# Patient Record
Sex: FEMALE | Race: WHITE | NOT HISPANIC OR LATINO
[De-identification: names, ages, dates, MRNs, and addresses within clinical notes are randomized per-mention and may not be internally consistent; named-entity substitution may affect disease eponyms.]

---

## 2023-11-27 ENCOUNTER — APPOINTMENT (OUTPATIENT)
Dept: OBGYN | Facility: CLINIC | Age: 28
End: 2023-11-27
Payer: COMMERCIAL

## 2023-11-27 PROCEDURE — 36415 COLL VENOUS BLD VENIPUNCTURE: CPT

## 2023-11-27 PROCEDURE — 99204 OFFICE O/P NEW MOD 45 MIN: CPT | Mod: 25

## 2023-11-27 PROCEDURE — 76817 TRANSVAGINAL US OBSTETRIC: CPT

## 2023-12-11 ENCOUNTER — APPOINTMENT (OUTPATIENT)
Dept: OBGYN | Facility: CLINIC | Age: 28
End: 2023-12-11
Payer: COMMERCIAL

## 2023-12-11 PROCEDURE — 0502F SUBSEQUENT PRENATAL CARE: CPT

## 2023-12-11 PROCEDURE — 76817 TRANSVAGINAL US OBSTETRIC: CPT

## 2023-12-11 PROCEDURE — 36415 COLL VENOUS BLD VENIPUNCTURE: CPT

## 2024-01-05 ENCOUNTER — APPOINTMENT (OUTPATIENT)
Dept: OBGYN | Facility: CLINIC | Age: 29
End: 2024-01-05
Payer: COMMERCIAL

## 2024-01-05 PROCEDURE — 0502F SUBSEQUENT PRENATAL CARE: CPT

## 2024-01-05 PROCEDURE — 76813 OB US NUCHAL MEAS 1 GEST: CPT

## 2024-01-05 PROCEDURE — 36415 COLL VENOUS BLD VENIPUNCTURE: CPT

## 2024-01-26 ENCOUNTER — APPOINTMENT (OUTPATIENT)
Dept: OBGYN | Facility: CLINIC | Age: 29
End: 2024-01-26
Payer: COMMERCIAL

## 2024-01-26 PROCEDURE — 0502F SUBSEQUENT PRENATAL CARE: CPT

## 2024-01-26 PROCEDURE — 36415 COLL VENOUS BLD VENIPUNCTURE: CPT

## 2024-03-06 ENCOUNTER — APPOINTMENT (OUTPATIENT)
Dept: OBGYN | Facility: CLINIC | Age: 29
End: 2024-03-06
Payer: COMMERCIAL

## 2024-03-06 ENCOUNTER — ASOB RESULT (OUTPATIENT)
Age: 29
End: 2024-03-06

## 2024-03-06 ENCOUNTER — APPOINTMENT (OUTPATIENT)
Dept: ANTEPARTUM | Facility: CLINIC | Age: 29
End: 2024-03-06
Payer: COMMERCIAL

## 2024-03-06 PROBLEM — Z00.00 ENCOUNTER FOR PREVENTIVE HEALTH EXAMINATION: Status: ACTIVE | Noted: 2024-03-06

## 2024-03-06 PROCEDURE — 76811 OB US DETAILED SNGL FETUS: CPT

## 2024-03-06 PROCEDURE — 0502F SUBSEQUENT PRENATAL CARE: CPT

## 2024-03-27 ENCOUNTER — APPOINTMENT (OUTPATIENT)
Dept: OBGYN | Facility: CLINIC | Age: 29
End: 2024-03-27
Payer: COMMERCIAL

## 2024-03-27 PROCEDURE — 0502F SUBSEQUENT PRENATAL CARE: CPT

## 2024-03-27 PROCEDURE — 96127 BRIEF EMOTIONAL/BEHAV ASSMT: CPT

## 2024-04-25 ENCOUNTER — APPOINTMENT (OUTPATIENT)
Dept: OBGYN | Facility: CLINIC | Age: 29
End: 2024-04-25
Payer: COMMERCIAL

## 2024-04-25 PROCEDURE — 0502F SUBSEQUENT PRENATAL CARE: CPT

## 2024-04-25 PROCEDURE — 76819 FETAL BIOPHYS PROFIL W/O NST: CPT | Mod: 59

## 2024-04-25 PROCEDURE — 76816 OB US FOLLOW-UP PER FETUS: CPT

## 2024-04-25 PROCEDURE — 36415 COLL VENOUS BLD VENIPUNCTURE: CPT

## 2024-05-20 ENCOUNTER — APPOINTMENT (OUTPATIENT)
Dept: OBGYN | Facility: CLINIC | Age: 29
End: 2024-05-20
Payer: COMMERCIAL

## 2024-05-20 PROCEDURE — 90715 TDAP VACCINE 7 YRS/> IM: CPT

## 2024-05-20 PROCEDURE — 76816 OB US FOLLOW-UP PER FETUS: CPT

## 2024-05-20 PROCEDURE — 90471 IMMUNIZATION ADMIN: CPT

## 2024-05-20 PROCEDURE — 0502F SUBSEQUENT PRENATAL CARE: CPT

## 2024-06-06 ENCOUNTER — APPOINTMENT (OUTPATIENT)
Dept: OBGYN | Facility: CLINIC | Age: 29
End: 2024-06-06
Payer: COMMERCIAL

## 2024-06-06 PROCEDURE — 0502F SUBSEQUENT PRENATAL CARE: CPT

## 2024-06-19 ENCOUNTER — APPOINTMENT (OUTPATIENT)
Dept: OBGYN | Facility: CLINIC | Age: 29
End: 2024-06-19
Payer: COMMERCIAL

## 2024-06-19 PROCEDURE — 0502F SUBSEQUENT PRENATAL CARE: CPT

## 2024-06-26 ENCOUNTER — APPOINTMENT (OUTPATIENT)
Dept: OBGYN | Facility: CLINIC | Age: 29
End: 2024-06-26
Payer: COMMERCIAL

## 2024-06-26 PROCEDURE — 76819 FETAL BIOPHYS PROFIL W/O NST: CPT | Mod: 59

## 2024-06-26 PROCEDURE — 76816 OB US FOLLOW-UP PER FETUS: CPT

## 2024-06-26 PROCEDURE — 0502F SUBSEQUENT PRENATAL CARE: CPT

## 2024-07-02 ENCOUNTER — APPOINTMENT (OUTPATIENT)
Dept: OBGYN | Facility: CLINIC | Age: 29
End: 2024-07-02
Payer: COMMERCIAL

## 2024-07-02 PROCEDURE — 0502F SUBSEQUENT PRENATAL CARE: CPT

## 2024-07-10 ENCOUNTER — APPOINTMENT (OUTPATIENT)
Dept: OBGYN | Facility: CLINIC | Age: 29
End: 2024-07-10
Payer: COMMERCIAL

## 2024-07-10 PROCEDURE — 76818 FETAL BIOPHYS PROFILE W/NST: CPT | Mod: 59

## 2024-07-10 PROCEDURE — 76816 OB US FOLLOW-UP PER FETUS: CPT

## 2024-07-10 PROCEDURE — 0502F SUBSEQUENT PRENATAL CARE: CPT

## 2024-07-14 ENCOUNTER — INPATIENT (INPATIENT)
Facility: HOSPITAL | Age: 29
LOS: 2 days | Discharge: HOME | DRG: 951 | End: 2024-07-17
Attending: STUDENT IN AN ORGANIZED HEALTH CARE EDUCATION/TRAINING PROGRAM | Admitting: STUDENT IN AN ORGANIZED HEALTH CARE EDUCATION/TRAINING PROGRAM
Payer: COMMERCIAL

## 2024-07-14 ENCOUNTER — TRANSCRIPTION ENCOUNTER (OUTPATIENT)
Age: 29
End: 2024-07-14

## 2024-07-14 VITALS — OXYGEN SATURATION: 98 % | HEART RATE: 79 BPM

## 2024-07-14 DIAGNOSIS — O26.899 OTHER SPECIFIED PREGNANCY RELATED CONDITIONS, UNSPECIFIED TRIMESTER: ICD-10-CM

## 2024-07-15 ENCOUNTER — APPOINTMENT (OUTPATIENT)
Dept: OBGYN | Facility: CLINIC | Age: 29
End: 2024-07-15

## 2024-07-15 DIAGNOSIS — Z3A.40 40 WEEKS GESTATION OF PREGNANCY: ICD-10-CM

## 2024-07-15 DIAGNOSIS — Z34.80 ENCOUNTER FOR SUPERVISION OF OTHER NORMAL PREGNANCY, UNSPECIFIED TRIMESTER: ICD-10-CM

## 2024-07-15 LAB
BASOPHILS # BLD AUTO: 0.02 K/UL — SIGNIFICANT CHANGE UP (ref 0–0.2)
BASOPHILS NFR BLD AUTO: 0.2 % — SIGNIFICANT CHANGE UP (ref 0–2)
BLD GP AB SCN SERPL QL: NEGATIVE — SIGNIFICANT CHANGE UP
EOSINOPHIL # BLD AUTO: 0.07 K/UL — SIGNIFICANT CHANGE UP (ref 0–0.5)
EOSINOPHIL NFR BLD AUTO: 0.6 % — SIGNIFICANT CHANGE UP (ref 0–6)
HCT VFR BLD CALC: 35.2 % — SIGNIFICANT CHANGE UP (ref 34.5–45)
HGB BLD-MCNC: 11.8 G/DL — SIGNIFICANT CHANGE UP (ref 11.5–15.5)
IMM GRANULOCYTES NFR BLD AUTO: 0.9 % — SIGNIFICANT CHANGE UP (ref 0–0.9)
LYMPHOCYTES # BLD AUTO: 1.38 K/UL — SIGNIFICANT CHANGE UP (ref 1–3.3)
LYMPHOCYTES # BLD AUTO: 12.2 % — LOW (ref 13–44)
MCHC RBC-ENTMCNC: 29.1 PG — SIGNIFICANT CHANGE UP (ref 27–34)
MCHC RBC-ENTMCNC: 33.5 GM/DL — SIGNIFICANT CHANGE UP (ref 32–36)
MCV RBC AUTO: 86.9 FL — SIGNIFICANT CHANGE UP (ref 80–100)
MONOCYTES # BLD AUTO: 0.83 K/UL — SIGNIFICANT CHANGE UP (ref 0–0.9)
MONOCYTES NFR BLD AUTO: 7.3 % — SIGNIFICANT CHANGE UP (ref 2–14)
NEUTROPHILS # BLD AUTO: 8.92 K/UL — HIGH (ref 1.8–7.4)
NEUTROPHILS NFR BLD AUTO: 78.8 % — HIGH (ref 43–77)
NRBC # BLD: 0 /100 WBCS — SIGNIFICANT CHANGE UP (ref 0–0)
PLATELET # BLD AUTO: 241 K/UL — SIGNIFICANT CHANGE UP (ref 150–400)
RBC # BLD: 4.05 M/UL — SIGNIFICANT CHANGE UP (ref 3.8–5.2)
RBC # FLD: 12.9 % — SIGNIFICANT CHANGE UP (ref 10.3–14.5)
RH IG SCN BLD-IMP: POSITIVE — SIGNIFICANT CHANGE UP
RH IG SCN BLD-IMP: POSITIVE — SIGNIFICANT CHANGE UP
WBC # BLD: 11.32 K/UL — HIGH (ref 3.8–10.5)
WBC # FLD AUTO: 11.32 K/UL — HIGH (ref 3.8–10.5)

## 2024-07-15 PROCEDURE — 88305 TISSUE EXAM BY PATHOLOGIST: CPT | Mod: 26

## 2024-07-15 PROCEDURE — 59510 CESAREAN DELIVERY: CPT

## 2024-07-15 DEVICE — INTERCEED 3 X 4": Type: IMPLANTABLE DEVICE | Status: FUNCTIONAL

## 2024-07-15 RX ORDER — LANOLIN
1 WAX (GRAM) MISCELLANEOUS EVERY 6 HOURS
Refills: 0 | Status: DISCONTINUED | OUTPATIENT
Start: 2024-07-15 | End: 2024-07-17

## 2024-07-15 RX ORDER — DEXTROSE MONOHYDRATE AND SODIUM CHLORIDE 5; .3 G/100ML; G/100ML
1000 INJECTION, SOLUTION INTRAVENOUS ONCE
Refills: 0 | Status: COMPLETED | OUTPATIENT
Start: 2024-07-15 | End: 2024-07-15

## 2024-07-15 RX ORDER — DIPHENHYDRAMINE HCL 12.5MG/5ML
25 ELIXIR ORAL EVERY 6 HOURS
Refills: 0 | Status: COMPLETED | OUTPATIENT
Start: 2024-07-15 | End: 2025-06-13

## 2024-07-15 RX ORDER — DIPHENHYDRAMINE HCL 12.5MG/5ML
25 ELIXIR ORAL EVERY 6 HOURS
Refills: 0 | Status: DISCONTINUED | OUTPATIENT
Start: 2024-07-15 | End: 2024-07-17

## 2024-07-15 RX ORDER — NALOXONE HYDROCHLORIDE 1 MG/ML
0.1 INJECTION PARENTERAL
Refills: 0 | Status: DISCONTINUED | OUTPATIENT
Start: 2024-07-15 | End: 2024-07-16

## 2024-07-15 RX ORDER — DEXTROSE MONOHYDRATE AND SODIUM CHLORIDE 5; .3 G/100ML; G/100ML
1000 INJECTION, SOLUTION INTRAVENOUS
Refills: 0 | Status: DISCONTINUED | OUTPATIENT
Start: 2024-07-15 | End: 2024-07-15

## 2024-07-15 RX ORDER — TRISODIUM CITRATE DIHYDRATE AND CITRIC ACID MONOHYDRATE 500; 334 MG/5ML; MG/5ML
15 SOLUTION ORAL EVERY 6 HOURS
Refills: 0 | Status: DISCONTINUED | OUTPATIENT
Start: 2024-07-15 | End: 2024-07-15

## 2024-07-15 RX ORDER — NALBUPHINE HCL 100 %
2.5 POWDER (GRAM) MISCELLANEOUS EVERY 6 HOURS
Refills: 0 | Status: DISCONTINUED | OUTPATIENT
Start: 2024-07-15 | End: 2024-07-16

## 2024-07-15 RX ORDER — DEXAMETHASONE 1 MG/1
4 TABLET ORAL EVERY 6 HOURS
Refills: 0 | Status: DISCONTINUED | OUTPATIENT
Start: 2024-07-15 | End: 2024-07-16

## 2024-07-15 RX ORDER — OXYTOCIN 30 [USP'U]/500ML
333.33 INJECTION, SOLUTION INTRAVENOUS
Qty: 20 | Refills: 0 | Status: DISCONTINUED | OUTPATIENT
Start: 2024-07-15 | End: 2024-07-17

## 2024-07-15 RX ORDER — KETOROLAC TROMETHAMINE 30 MG/ML
30 INJECTION, SOLUTION INTRAMUSCULAR EVERY 6 HOURS
Refills: 0 | Status: COMPLETED | OUTPATIENT
Start: 2024-07-15 | End: 2024-07-17

## 2024-07-15 RX ORDER — SIMETHICONE 40MG/0.6ML
80 SUSPENSION, DROPS(FINAL DOSAGE FORM)(ML) ORAL EVERY 4 HOURS
Refills: 0 | Status: DISCONTINUED | OUTPATIENT
Start: 2024-07-15 | End: 2024-07-17

## 2024-07-15 RX ORDER — OXYCODONE HYDROCHLORIDE 100 MG/5ML
5 SOLUTION ORAL ONCE
Refills: 0 | Status: DISCONTINUED | OUTPATIENT
Start: 2024-07-15 | End: 2024-07-17

## 2024-07-15 RX ORDER — DEXTROSE MONOHYDRATE AND SODIUM CHLORIDE 5; .3 G/100ML; G/100ML
1000 INJECTION, SOLUTION INTRAVENOUS
Refills: 0 | Status: DISCONTINUED | OUTPATIENT
Start: 2024-07-15 | End: 2024-07-17

## 2024-07-15 RX ORDER — ONDANSETRON HYDROCHLORIDE 2 MG/ML
4 INJECTION INTRAMUSCULAR; INTRAVENOUS EVERY 6 HOURS
Refills: 0 | Status: DISCONTINUED | OUTPATIENT
Start: 2024-07-15 | End: 2024-07-16

## 2024-07-15 RX ORDER — OXYCODONE HYDROCHLORIDE 100 MG/5ML
5 SOLUTION ORAL
Refills: 0 | Status: COMPLETED | OUTPATIENT
Start: 2024-07-15 | End: 2024-07-22

## 2024-07-15 RX ORDER — ACETAMINOPHEN 325 MG
975 TABLET ORAL
Refills: 0 | Status: DISCONTINUED | OUTPATIENT
Start: 2024-07-15 | End: 2024-07-17

## 2024-07-15 RX ORDER — OXYTOCIN 30 [USP'U]/500ML
4 INJECTION, SOLUTION INTRAVENOUS
Qty: 30 | Refills: 0 | Status: DISCONTINUED | OUTPATIENT
Start: 2024-07-15 | End: 2024-07-17

## 2024-07-15 RX ORDER — MORPHINE SULFATE 100 MG/1
2 TABLET, EXTENDED RELEASE ORAL ONCE
Refills: 0 | Status: DISCONTINUED | OUTPATIENT
Start: 2024-07-15 | End: 2024-07-16

## 2024-07-15 RX ORDER — TETANUS TOXOID, REDUCED DIPHTHERIA TOXOID AND ACELLULAR PERTUSSIS VACCINE, ADSORBED 5; 2.5; 8; 8; 2.5 [IU]/.5ML; [IU]/.5ML; UG/.5ML; UG/.5ML; UG/.5ML
0.5 SUSPENSION INTRAMUSCULAR ONCE
Refills: 0 | Status: DISCONTINUED | OUTPATIENT
Start: 2024-07-15 | End: 2024-07-17

## 2024-07-15 RX ORDER — PIPERACILLIN SODIUM AND TAZOBACTAM SODIUM 3; .375 G/15ML; G/15ML
4.5 INJECTION, POWDER, LYOPHILIZED, FOR SOLUTION INTRAVENOUS ONCE
Refills: 0 | Status: COMPLETED | OUTPATIENT
Start: 2024-07-15 | End: 2024-07-15

## 2024-07-15 RX ORDER — HEPARIN SODIUM 50 [USP'U]/ML
5000 INJECTION, SOLUTION INTRAVENOUS EVERY 12 HOURS
Refills: 0 | Status: DISCONTINUED | OUTPATIENT
Start: 2024-07-15 | End: 2024-07-17

## 2024-07-15 RX ADMIN — Medication 975 MILLIGRAM(S): at 23:19

## 2024-07-15 RX ADMIN — KETOROLAC TROMETHAMINE 30 MILLIGRAM(S): 30 INJECTION, SOLUTION INTRAMUSCULAR at 22:06

## 2024-07-15 RX ADMIN — PIPERACILLIN SODIUM AND TAZOBACTAM SODIUM 200 GRAM(S): 3; .375 INJECTION, POWDER, LYOPHILIZED, FOR SOLUTION INTRAVENOUS at 15:55

## 2024-07-15 RX ADMIN — Medication 975 MILLIGRAM(S): at 16:41

## 2024-07-15 RX ADMIN — Medication 1 APPLICATION(S): at 12:25

## 2024-07-15 RX ADMIN — HEPARIN SODIUM 5000 UNIT(S): 50 INJECTION, SOLUTION INTRAVENOUS at 21:06

## 2024-07-15 RX ADMIN — KETOROLAC TROMETHAMINE 30 MILLIGRAM(S): 30 INJECTION, SOLUTION INTRAMUSCULAR at 21:06

## 2024-07-15 RX ADMIN — Medication 2.5 MILLIGRAM(S): at 19:09

## 2024-07-15 RX ADMIN — DEXTROSE MONOHYDRATE AND SODIUM CHLORIDE 125 MILLILITER(S): 5; .3 INJECTION, SOLUTION INTRAVENOUS at 08:45

## 2024-07-15 RX ADMIN — DEXTROSE MONOHYDRATE AND SODIUM CHLORIDE 1000 MILLILITER(S): 5; .3 INJECTION, SOLUTION INTRAVENOUS at 02:38

## 2024-07-15 RX ADMIN — Medication 25 MILLIGRAM(S): at 23:35

## 2024-07-15 RX ADMIN — OXYTOCIN 4 MILLIUNIT(S)/MIN: 30 INJECTION, SOLUTION INTRAVENOUS at 04:12

## 2024-07-15 NOTE — OB PROVIDER H&P - NSHPREVIEWOFSYSTEMS_GEN_ALL_CORE
Pt notes experiencing contractions beginning at 5:30pm, now increased in frequency and intensity. Pt states contractions occurring every 3-4 minutes and 8/10 pain. Pt verbalizes desire for an epidural at this time. +FM. -LOF. -VB. Pt denies chest pain, SOB, palpitations, or any other concerns.

## 2024-07-15 NOTE — OB PROVIDER H&P - NSLOWPPHRISK_OBGYN_A_OB
No previous uterine incision/Jimenez Pregnancy/Less than or equal to 4 previous vaginal births/No known bleeding disorder

## 2024-07-15 NOTE — OB PROVIDER H&P - PROBLEM SELECTOR PLAN 1
Plan  1. Admit to L&D. Routine Labs. IVF.  2. Expectant management  3. Fetus: cat 1 tracing. VTX. EFW 3500g extrapolated from sono in office 4 days ago. Continuous EFM. Sono. No concerns.  4. Prenatal issues: none  5. GBS negative  6. Pain: anesthesia consult for an epidural    Discussed with Felicia Galvin PA-C, Dr. Alejandro aware  Margaret Ayala PA-C

## 2024-07-15 NOTE — OB PROVIDER H&P - NS_OBGYNHISTORY_OBGYN_ALL_OB_FT
– PNC: Denies prenatal issues. GBS negative. EFW 3500g extrapolated from sono in office 4 days ago.  – OBHx: Primigravida. Denies miscarriages, terminations, ectopic pregnancies.  – GynHx: Denies fibroids, ovarian cysts, abnormal pap smears, STIs

## 2024-07-15 NOTE — OB PROVIDER LABOR PROGRESS NOTE - ASSESSMENT
29F  @ 40.1wks gestational age, labor at term.   Comfortable w/ epidural  c/w Pitocin  c/w peanut ball  KCrowley PAC  
BACKNOTE:  Pt fully dilated and pushing x > 2hrs, good effort,  no descent of fetal head with molding.   Discussed w/ Pt and , all questions answered.   Decision made to proceed with pLTCS for failure to descend.  Jose PAC
- FHT resolved with maternal repositioning   - pt comfortable with epidural   - pt making cervical change, continue with expectant management     Anitha Sullivan, PGY4  d/w Felicia SESAY
29F  @ 40.1wks gestational age,  labor at term.   Comfortable w/ epidural  s/p AROM  [facility]
29F  @ 40.1wks gestational age, labor at term.   Comfortable w/ epidural  c/w Pitocin  c/w peanut ball  KCrowley PAC

## 2024-07-15 NOTE — OB RN PATIENT PROFILE - NS_CONTACTNUMBEROFSUPPORTPERSON_OBGYN_ALL_OB_FT
[id number] Vital Signs Last 24 Hrs  T(C): 36.2 (07-21-22 @ 08:13), Max: 37.2 (07-20-22 @ 15:44)  T(F): 97.2 (07-21-22 @ 08:13), Max: 99 (07-20-22 @ 15:44)  HR: --  BP: --  BP(mean): --  RR: --  SpO2: --    Orthostatic VS  07-21-22 @ 08:13  Lying BP: --/-- HR: --  Sitting BP: 102/63 HR: 70  Standing BP: 104/66 HR: 79  Site: --  Mode: --  Orthostatic VS  07-20-22 @ 08:08  Lying BP: --/-- HR: --  Sitting BP: 108/68 HR: 75  Standing BP: 110/70 HR: 81  Site: upper left arm  Mode: electronic   Vital Signs Last 24 Hrs  T(C): 37.2 (07-21-22 @ 15:27), Max: 37.2 (07-21-22 @ 15:27)  T(F): 99 (07-21-22 @ 15:27), Max: 99 (07-21-22 @ 15:27)  HR: --  BP: --  BP(mean): --  RR: --  SpO2: --    Orthostatic VS  07-21-22 @ 08:13  Lying BP: --/-- HR: --  Sitting BP: 102/63 HR: 70  Standing BP: 104/66 HR: 79  Site: --  Mode: --  Orthostatic VS  07-20-22 @ 08:08  Lying BP: --/-- HR: --  Sitting BP: 108/68 HR: 75  Standing BP: 110/70 HR: 81  Site: upper left arm  Mode: electronic

## 2024-07-15 NOTE — OB PROVIDER LABOR PROGRESS NOTE - NS_OBIHIFHRDETAILS_OBGYN_ALL_OB_FT
140, moderate, +accels, 4 minute deceleration
135/ mod chapo/ (+) accel/ (-) decel
135/ mod chapo/ (+) accel/ (-) decel
130/mod chapo/ (+) accl/ (-) decel

## 2024-07-15 NOTE — OB RN DELIVERY SUMMARY - NSCSDELIVASCHE_OBGYN_ALL_OB
She will continue current medications and treatment with the prednisone 5 mg per day.  She is referred to the hepatologist for evaluation of the hepatitis.  Ultrasound labs will be scheduled.  The records from [place] were requested.   Unscheduled

## 2024-07-15 NOTE — OB PROVIDER H&P - HISTORY OF PRESENT ILLNESS
PA Admission H&P    Subjective  HPI: 29F  40.1wks gestational age presents for ROL. Pt notes experiencing contractions beginning at 5:30pm, now increased in frequency and intensity. Pt states contractions occurring every 3-4 minutes and 8/10 pain. Pt verbalizes desire for an epidural at this time. +FM. -LOF. -VB. Pt denies chest pain, SOB, palpitations, or any other concerns.    – PNC: Denies prenatal issues. GBS negative. EFW 3500g extrapolated from sono in office 4 days ago.  – OBHx: Primigravida. Denies miscarriages, terminations, ectopic pregnancies.  – GynHx: Denies fibroids, ovarian cysts, abnormal pap smears, STIs  – PMH: Denies asthma, thyroid disorders, renal/liver disease, bleeding/clotting disorders  – PSH: Deneis  – Psych: Denies anxiety, depression  – Social: Denies T/E/D  – Meds: PNV   – Allergies: NKDA  – Will accept blood transfusions? Yes

## 2024-07-15 NOTE — OB PROVIDER LABOR PROGRESS NOTE - NS_SUBJECTIVE/OBJECTIVE_OBGYN_ALL_OB_FT
VE due to deceleration
Pt comfortable with epidural. AROM- minimal clear fluid, w/o incident
Pt examined to asses cervical change. Comfortable w/ epidural. Denies pressure,
Pt comfortable w/ epidural. Examined to assess cervical change

## 2024-07-15 NOTE — OB RN INTRAOPERATIVE NOTE - NSSELHIDDEN_OBGYN_ALL_OB_FT
[NS_DeliveryAttending1_OBGYN_ALL_OB_FT:[id number]],[NS_DeliveryAssist1_OBGYN_ALL_OB_FT:[id number]==],[NS_DeliveryRN_OBGYN_ALL_OB_FT:[id number]==]

## 2024-07-15 NOTE — OB PROVIDER DELIVERY SUMMARY - NSPROVIDERDELIVERYNOTE_OBGYN_ALL_OB_FT
pLTCS for failure to descend  Viable Male infant, Apgars 9/9, weight 3325g  Hysterotomy closed in 2 layers using PDS  Intercede placed over the anterior surface of the uterus  Grossly normal uterus, tubes, and ovaries  Abdomen closed in standard fashion  3 x labial skin tags excised w/o incident , sent to pathology  Pt and infant to recovery in stable condition  Pt w/ PPT in recovery 38.5, for zosyn x 1  QBL: 660     IVF:    1400  UOP: 200  Dictation#: pLTCS for failure to descend  Viable Male infant, Apgars 9/9, weight 3325g  Hysterotomy closed in 2 layers using PDS  Intercede placed over the anterior surface of the uterus  Grossly normal uterus, tubes, and ovaries  Abdomen closed in standard fashion  3 x labial skin tags excised w/o incident , sent to pathology  Pt and infant to recovery in stable condition  Pt w/ PPT in recovery 38.5, for zosyn x 1  QBL: 660     IVF:    1400  UOP: 200  Dictation#: [id number]

## 2024-07-15 NOTE — OB RN PATIENT PROFILE - PATIENT REPRESENTATIVE NAME
Continue Regimen: Clobetasol 0.05 % scalp solution QHS
Initiate Treatment: tacrolimus 0.1 % topical ointment
Detail Level: Zone
feliberto hernandez

## 2024-07-15 NOTE — OB RN DELIVERY SUMMARY - NSSELHIDDEN_OBGYN_ALL_OB_FT
[NS_DeliveryAttending1_OBGYN_ALL_OB_FT:[id number]],[NS_DeliveryAssist1_OBGYN_ALL_OB_FT:[id number]==],[NS_DeliveryRN_OBGYN_ALL_OB_FT:[id number]==]

## 2024-07-15 NOTE — OB PROVIDER H&P - NSHPPHYSICALEXAM_GEN_ALL_CORE
Objective  – VS  T(C): 36.8 (07-15-24 @ 00:15)  HR: 88 (07-15-24 @ 00:19)  BP: 129/78 (07-15-24 @ 00:15)  RR: 18 (07-15-24 @ 00:15)  SpO2: 98% (07-15-24 @ 00:19)  – PE:   General: NAD  CV: RRR  Pulm: breathing comfortably on RA  Abd: gravid, nontender  Extr: moving all extremities with ease  – VE: 3.5/70/-3  – FHT: 135/moderate variability/+accels/-decels  – [place]: every 3-4 min  – EFW: 3500g extrapolated from sono in office 4 days ago  – Sono: vertex

## 2024-07-15 NOTE — OB RN DELIVERY SUMMARY - NS_SEPSISRSKCALC_OBGYN_ALL_OB_FT
EOS calculated successfully. EOS Risk Factor: 0.5/1000 live births ([facility] national incidence); GA=40w1d; Temp=101.3; ROM=8.1; GBS='Negative'; Antibiotics='No antibiotics or any antibiotics < 2 hrs prior to birth'

## 2024-07-15 NOTE — OB PROVIDER DELIVERY SUMMARY - NSSELHIDDEN_OBGYN_ALL_OB_FT
[NS_DeliveryAttending1_OBGYN_ALL_OB_FT:[id number]],[NS_DeliveryAssist1_OBGYN_ALL_OB_FT:[id number]==]

## 2024-07-16 LAB
BASOPHILS # BLD AUTO: 0.04 K/UL — SIGNIFICANT CHANGE UP (ref 0–0.2)
BASOPHILS NFR BLD AUTO: 0.2 % — SIGNIFICANT CHANGE UP (ref 0–2)
EOSINOPHIL # BLD AUTO: 0.07 K/UL — SIGNIFICANT CHANGE UP (ref 0–0.5)
EOSINOPHIL NFR BLD AUTO: 0.4 % — SIGNIFICANT CHANGE UP (ref 0–6)
HCT VFR BLD CALC: 24.5 % — LOW (ref 34.5–45)
HGB BLD-MCNC: 8.3 G/DL — LOW (ref 11.5–15.5)
IMM GRANULOCYTES NFR BLD AUTO: 0.6 % — SIGNIFICANT CHANGE UP (ref 0–0.9)
LYMPHOCYTES # BLD AUTO: 1.96 K/UL — SIGNIFICANT CHANGE UP (ref 1–3.3)
LYMPHOCYTES # BLD AUTO: 10.3 % — LOW (ref 13–44)
MCHC RBC-ENTMCNC: 29.6 PG — SIGNIFICANT CHANGE UP (ref 27–34)
MCHC RBC-ENTMCNC: 33.9 GM/DL — SIGNIFICANT CHANGE UP (ref 32–36)
MCV RBC AUTO: 87.5 FL — SIGNIFICANT CHANGE UP (ref 80–100)
MONOCYTES # BLD AUTO: 1.24 K/UL — HIGH (ref 0–0.9)
MONOCYTES NFR BLD AUTO: 6.5 % — SIGNIFICANT CHANGE UP (ref 2–14)
NEUTROPHILS # BLD AUTO: 15.67 K/UL — HIGH (ref 1.8–7.4)
NEUTROPHILS NFR BLD AUTO: 82 % — HIGH (ref 43–77)
NRBC # BLD: 0 /100 WBCS — SIGNIFICANT CHANGE UP (ref 0–0)
PLATELET # BLD AUTO: 172 K/UL — SIGNIFICANT CHANGE UP (ref 150–400)
RBC # BLD: 2.8 M/UL — LOW (ref 3.8–5.2)
RBC # FLD: 13.1 % — SIGNIFICANT CHANGE UP (ref 10.3–14.5)
RPR SERPL-ACNC: SIGNIFICANT CHANGE UP
T PALLIDUM AB TITR SER: POSITIVE
T PALLIDUM IGG SER QL IF: SIGNIFICANT CHANGE UP
WBC # BLD: 19.09 K/UL — HIGH (ref 3.8–10.5)
WBC # FLD AUTO: 19.09 K/UL — HIGH (ref 3.8–10.5)

## 2024-07-16 RX ORDER — SENNOSIDES 8.6 MG
1 TABLET ORAL DAILY
Refills: 0 | Status: DISCONTINUED | OUTPATIENT
Start: 2024-07-16 | End: 2024-07-17

## 2024-07-16 RX ORDER — OXYCODONE HYDROCHLORIDE 100 MG/5ML
5 SOLUTION ORAL
Refills: 0 | Status: DISCONTINUED | OUTPATIENT
Start: 2024-07-16 | End: 2024-07-17

## 2024-07-16 RX ORDER — FERROUS SULFATE 325(65) MG
325 TABLET ORAL
Refills: 0 | Status: DISCONTINUED | OUTPATIENT
Start: 2024-07-16 | End: 2024-07-17

## 2024-07-16 RX ADMIN — Medication 975 MILLIGRAM(S): at 17:11

## 2024-07-16 RX ADMIN — KETOROLAC TROMETHAMINE 30 MILLIGRAM(S): 30 INJECTION, SOLUTION INTRAMUSCULAR at 08:50

## 2024-07-16 RX ADMIN — HEPARIN SODIUM 5000 UNIT(S): 50 INJECTION, SOLUTION INTRAVENOUS at 21:08

## 2024-07-16 RX ADMIN — KETOROLAC TROMETHAMINE 30 MILLIGRAM(S): 30 INJECTION, SOLUTION INTRAMUSCULAR at 16:16

## 2024-07-16 RX ADMIN — Medication 975 MILLIGRAM(S): at 06:52

## 2024-07-16 RX ADMIN — Medication 2.5 MILLIGRAM(S): at 03:11

## 2024-07-16 RX ADMIN — Medication 500 MILLIGRAM(S): at 21:10

## 2024-07-16 RX ADMIN — KETOROLAC TROMETHAMINE 30 MILLIGRAM(S): 30 INJECTION, SOLUTION INTRAMUSCULAR at 03:37

## 2024-07-16 RX ADMIN — Medication 325 MILLIGRAM(S): at 17:11

## 2024-07-16 RX ADMIN — Medication 975 MILLIGRAM(S): at 23:23

## 2024-07-16 RX ADMIN — KETOROLAC TROMETHAMINE 30 MILLIGRAM(S): 30 INJECTION, SOLUTION INTRAMUSCULAR at 02:37

## 2024-07-16 RX ADMIN — KETOROLAC TROMETHAMINE 30 MILLIGRAM(S): 30 INJECTION, SOLUTION INTRAMUSCULAR at 09:20

## 2024-07-16 RX ADMIN — KETOROLAC TROMETHAMINE 30 MILLIGRAM(S): 30 INJECTION, SOLUTION INTRAMUSCULAR at 15:46

## 2024-07-16 RX ADMIN — Medication 80 MILLIGRAM(S): at 21:09

## 2024-07-16 RX ADMIN — Medication 2.5 MILLIGRAM(S): at 02:41

## 2024-07-16 RX ADMIN — Medication 975 MILLIGRAM(S): at 05:52

## 2024-07-16 RX ADMIN — Medication 1 TABLET(S): at 17:11

## 2024-07-16 RX ADMIN — KETOROLAC TROMETHAMINE 30 MILLIGRAM(S): 30 INJECTION, SOLUTION INTRAMUSCULAR at 21:09

## 2024-07-16 RX ADMIN — Medication 975 MILLIGRAM(S): at 00:19

## 2024-07-16 RX ADMIN — Medication 975 MILLIGRAM(S): at 11:58

## 2024-07-16 RX ADMIN — HEPARIN SODIUM 5000 UNIT(S): 50 INJECTION, SOLUTION INTRAVENOUS at 08:52

## 2024-07-16 RX ADMIN — Medication 80 MILLIGRAM(S): at 12:15

## 2024-07-16 RX ADMIN — Medication 975 MILLIGRAM(S): at 11:28

## 2024-07-16 RX ADMIN — KETOROLAC TROMETHAMINE 30 MILLIGRAM(S): 30 INJECTION, SOLUTION INTRAMUSCULAR at 22:09

## 2024-07-16 NOTE — CHART NOTE - NSCHARTNOTEFT_GEN_A_CORE
PA Anemia NOTE     POD#1        Vital Signs Last 24 Hrs  T(C): 36.7 (2024 13:02), Max: 37.8 (15 Jul 2024 16:15)  T(F): 98.1 (2024 13:02), Max: 100 (15 Jul 2024 16:15)  HR: 79 (2024 13:02) (74 - 92)  BP: 107/68 (2024 13:02) (94/57 - 111/56)  BP(mean): 76 (15 Jul 2024 16:15) (76 - 76)  RR: 18 (2024 13:02) (18 - 18)  SpO2: 99% (:02) (96% - 99%)    Parameters below as of 2024 13:02  Patient On (Oxygen Delivery Method): room air               8.3    19.09 )-----------( 172      ( -16 @ 07:08 )             24.5                11.8   11.32 )-----------( 241      ( 07-15 @ 00:39 )             35.2     Assessment:  29 y.o. S/P  C/S POD # 1 with anemia due to acute blood loss-VSS/Asx-not requiring blood tranfusion for Iron Supplementation  Plan:  - Ferrous Sulfate, Colace, Vitamin C supplementation.  - Monitor for signs/symptoms of anemia.     RIMMA Faria

## 2024-07-17 ENCOUNTER — TRANSCRIPTION ENCOUNTER (OUTPATIENT)
Age: 29
End: 2024-07-17

## 2024-07-17 VITALS
TEMPERATURE: 98 F | DIASTOLIC BLOOD PRESSURE: 61 MMHG | HEART RATE: 80 BPM | SYSTOLIC BLOOD PRESSURE: 96 MMHG | OXYGEN SATURATION: 97 % | RESPIRATION RATE: 18 BRPM

## 2024-07-17 PROCEDURE — C1765: CPT

## 2024-07-17 PROCEDURE — 86850 RBC ANTIBODY SCREEN: CPT

## 2024-07-17 PROCEDURE — 86592 SYPHILIS TEST NON-TREP QUAL: CPT

## 2024-07-17 PROCEDURE — 85025 COMPLETE CBC W/AUTO DIFF WBC: CPT

## 2024-07-17 PROCEDURE — 86901 BLOOD TYPING SEROLOGIC RH(D): CPT

## 2024-07-17 PROCEDURE — 86900 BLOOD TYPING SEROLOGIC ABO: CPT

## 2024-07-17 PROCEDURE — 86780 TREPONEMA PALLIDUM: CPT

## 2024-07-17 PROCEDURE — 59025 FETAL NON-STRESS TEST: CPT

## 2024-07-17 PROCEDURE — 59050 FETAL MONITOR W/REPORT: CPT

## 2024-07-17 PROCEDURE — 88305 TISSUE EXAM BY PATHOLOGIST: CPT

## 2024-07-17 RX ORDER — PRENATAL VIT/IRON FUM/FOLIC AC 60 MG-1 MG
1 TABLET ORAL
Refills: 0 | DISCHARGE

## 2024-07-17 RX ADMIN — Medication 975 MILLIGRAM(S): at 05:59

## 2024-07-17 RX ADMIN — Medication 325 MILLIGRAM(S): at 05:49

## 2024-07-17 RX ADMIN — Medication 600 MILLIGRAM(S): at 09:01

## 2024-07-17 RX ADMIN — HEPARIN SODIUM 5000 UNIT(S): 50 INJECTION, SOLUTION INTRAVENOUS at 09:01

## 2024-07-17 RX ADMIN — Medication 975 MILLIGRAM(S): at 12:15

## 2024-07-17 RX ADMIN — KETOROLAC TROMETHAMINE 30 MILLIGRAM(S): 30 INJECTION, SOLUTION INTRAMUSCULAR at 03:02

## 2024-07-17 RX ADMIN — Medication 600 MILLIGRAM(S): at 09:40

## 2024-07-17 RX ADMIN — Medication 975 MILLIGRAM(S): at 00:23

## 2024-07-17 RX ADMIN — Medication 975 MILLIGRAM(S): at 11:28

## 2024-07-17 RX ADMIN — Medication 975 MILLIGRAM(S): at 05:49

## 2024-07-17 RX ADMIN — KETOROLAC TROMETHAMINE 30 MILLIGRAM(S): 30 INJECTION, SOLUTION INTRAMUSCULAR at 03:32

## 2024-07-17 RX ADMIN — Medication 500 MILLIGRAM(S): at 11:29

## 2024-07-17 NOTE — PROGRESS NOTE ADULT - ASSESSMENT
A/P:  29y  P1 now POD # 1 S/P primary   section, doing well    Increase OOB  DVT ppx  encourage PO hydration, will repeat bladder scan in 2 hours, will straight cath if >400ml  Regular diet  AM CBC pending results  Routine Postpartum/Post-op care    Virginia Quinteros PA-C
  A/P:  29y  P1 now POD # 2 S/P primary   section, doing well

## 2024-07-17 NOTE — PROGRESS NOTE ADULT - ATTENDING COMMENTS
I agree with the resident's note above.    Patient is doing well. Pain is well controlled. Tolerating regular diet, ambulating, and voiding.  Vital signs stable  Incision is c/d/i.    Plan:  Iron for acute blood loss anemia -- vss -- no indication for transfusion at this time  Reg diet  Ambulating  Pain control  Routine postpartum care    gchow
agree with above note  stable for discharge  jhonny marquez md

## 2024-07-17 NOTE — PROGRESS NOTE ADULT - SUBJECTIVE AND OBJECTIVE BOX
Postpartum Note-  Section POD#1    Allergies    No Known Allergies    Intolerances      S:Patient is a  29y P1 now POD#1 s/p pLTCS for arrest of descent   pt seen and examined at bedside. failed TOV, bladder scan 360ml. pain controlled. passing flatus. tolerating PO. denies dizziness, SOB, CP or palpitations.   Feeding: breasfeeding     O:  Vital Signs Last 24 Hrs  T(C): 36.6 (2024 06:25), Max: 38.5 (15 Jul 2024 13:54)  T(F): 97.9 (2024 06:25), Max: 101.3 (15 Jul 2024 13:54)  HR: 85 (2024 06:25) (68 - 118)  BP: 101/63 (2024 06:25) (94/57 - 132/75)  BP(mean): 76 (15 Jul 2024 16:15) (76 - 79)  RR: 18 (2024 06:25) (18 - 18)  SpO2: 97% (2024 06:25) (73% - 100%)    Parameters below as of 2024 06:25  Patient On (Oxygen Delivery Method): room air      I&O's Summary    15 Jul 2024 07:01  -  2024 07:00  --------------------------------------------------------  IN: 1611.9 mL / OUT: 2160 mL / NET: -548.1 mL        Gen: NAD  CV: rrr s1s2, CTABL  Abdomen: Soft, nontender, non-distended, fundus firm.  Bowel sounds x 4 quadrants  Incision: Clean, dry, and intact.  Negative erythema/edema/ecchymosis   Sub Q  Lochia WNL  Ext: PAS in place. Negative Homans B/L.  Pedal pulses palpated B/L    LABS:                          11.8   11.32 )-----------( 241      ( 15 Jul 2024 00:39 )             35.2               
Day 1 of Anesthesia Pain Management Service    SUBJECTIVE:  Pain Scale Score:          [X] Refer to charted pain scores    THERAPY: Received PF neuraxial morphine as per pain service nurse's note    OBJECTIVE:    Sedation:        	[X] Alert	[ ] Drowsy	[ ] Arousable      [ ] Asleep       [ ] Unresponsive    Side Effects:	[ ] None	[ ] Nausea	[ ] Vomiting         [ ] Pruritus  		[ ] Weakness            [ ] Numbness	          [X] Other: Urinary retention    ASSESSMENT/ PLAN  [X] Patient transitioned to prn analgesics  [X] Pain management per primary service, pain service to sign off   [X]Documentation and Verification of current medications
 Postpartum Note-  Section POD#2    Allergies    No Known Allergies    Intolerances      S:Patient is a  29y P1 now POD#2 s/p pLTCS for arrest of descent   pt seen and examined at bedside. pain controlled. passing flatus. tolerating PO. denies dizziness, SOB, CP or palpitations.   Feeding: breasfeeding     Vital Signs Last 24 Hrs  T(C): 36.6 (2024 06:27), Max: 36.8 (2024 21:00)  T(F): 97.8 (2024 06:27), Max: 98.2 (2024 21:00)  HR: 80 (2024 06:27) (79 - 89)  BP: 96/61 (2024 06:27) (96/61 - 107/68)  BP(mean): --  RR: 18 (2024 06:27) (18 - 18)  SpO2: 97% (2024 06:27) (97% - 99%)    Parameters below as of 2024 06:27  Patient On (Oxygen Delivery Method): room air      Gen: NAD  CV: rrr s1s2, CTABL  Abdomen: Soft, nontender, non-distended, fundus firm.  Bowel sounds x 4 quadrants  Incision: Clean, dry, and intact.  Negative erythema/edema/ecchymosis   Sub Q  Lochia WNL  Ext: Negative Homans B/L.  Pedal pulses palpated B/L    LABS:                        8.3    19.09 )-----------( 172      ( 2024 07:08 )             24.5

## 2024-07-17 NOTE — DISCHARGE NOTE OB - CARE PROVIDER_API CALL
Alverto Alejandro  Obstetrics and Gynecology  [address] [place] [address], [address]  [place] [address]  Phone: [phone]  Fax: [phone]  Follow Up Time:

## 2024-07-17 NOTE — DISCHARGE NOTE OB - NS MD DC FALL RISK RISK
For information on Fall & Injury Prevention, visit: [email or web address] OR  [email or web address] OR  https://www.cdc.gov/steadi/patient.html

## 2024-07-17 NOTE — DISCHARGE NOTE OB - PATIENT PORTAL LINK FT
You can access the FollowMyHealth Patient Portal offered by [facility] by registering at the following website: [email or web address]. By joining [facility]’s FollowMyHealth portal, you will also be able to view your health information using other applications (apps) compatible with our system.

## 2024-07-18 PROBLEM — Z78.9 OTHER SPECIFIED HEALTH STATUS: Chronic | Status: ACTIVE | Noted: 2024-07-15

## 2024-07-24 LAB — SURGICAL PATHOLOGY STUDY: SIGNIFICANT CHANGE UP

## 2024-07-29 ENCOUNTER — APPOINTMENT (OUTPATIENT)
Dept: OBGYN | Facility: CLINIC | Age: 29
End: 2024-07-29
Payer: COMMERCIAL

## 2024-07-29 PROCEDURE — 99024 POSTOP FOLLOW-UP VISIT: CPT

## 2024-08-26 ENCOUNTER — APPOINTMENT (OUTPATIENT)
Dept: OBGYN | Facility: CLINIC | Age: 29
End: 2024-08-26
Payer: COMMERCIAL

## 2024-08-26 PROCEDURE — 0503F POSTPARTUM CARE VISIT: CPT

## 2025-02-12 ENCOUNTER — APPOINTMENT (OUTPATIENT)
Dept: OBGYN | Facility: CLINIC | Age: 30
End: 2025-02-12

## 2025-03-31 ENCOUNTER — APPOINTMENT (OUTPATIENT)
Dept: OBGYN | Facility: CLINIC | Age: 30
End: 2025-03-31
Payer: COMMERCIAL

## 2025-03-31 ENCOUNTER — RESULT REVIEW (OUTPATIENT)
Age: 30
End: 2025-03-31

## 2025-03-31 PROCEDURE — 99212 OFFICE O/P EST SF 10 MIN: CPT | Mod: 25

## 2025-03-31 PROCEDURE — 99395 PREV VISIT EST AGE 18-39: CPT

## 2025-03-31 PROCEDURE — 96127 BRIEF EMOTIONAL/BEHAV ASSMT: CPT

## 2025-09-05 ENCOUNTER — APPOINTMENT (OUTPATIENT)
Dept: OBGYN | Facility: CLINIC | Age: 30
End: 2025-09-05